# Patient Record
Sex: MALE | Race: WHITE | NOT HISPANIC OR LATINO | Employment: FULL TIME | URBAN - METROPOLITAN AREA
[De-identification: names, ages, dates, MRNs, and addresses within clinical notes are randomized per-mention and may not be internally consistent; named-entity substitution may affect disease eponyms.]

---

## 2021-04-08 ENCOUNTER — OFFICE VISIT (OUTPATIENT)
Dept: GASTROENTEROLOGY | Facility: CLINIC | Age: 41
End: 2021-04-08

## 2021-04-08 VITALS — WEIGHT: 226.4 LBS | TEMPERATURE: 97 F | BODY MASS INDEX: 28.15 KG/M2 | HEIGHT: 75 IN

## 2021-04-08 DIAGNOSIS — R13.12 OROPHARYNGEAL DYSPHAGIA: Primary | ICD-10-CM

## 2021-04-08 PROCEDURE — 99203 OFFICE O/P NEW LOW 30 MIN: CPT | Performed by: INTERNAL MEDICINE

## 2021-04-08 RX ORDER — OMEPRAZOLE 20 MG/1
20 CAPSULE, DELAYED RELEASE ORAL DAILY
Qty: 90 CAPSULE | Refills: 3 | Status: SHIPPED | OUTPATIENT
Start: 2021-04-08

## 2021-04-08 RX ORDER — MULTIPLE VITAMINS W/ MINERALS TAB 9MG-400MCG
1 TAB ORAL DAILY
COMMUNITY

## 2021-04-08 RX ORDER — CALCIUM CARBONATE 200(500)MG
1 TABLET,CHEWABLE ORAL DAILY
COMMUNITY

## 2021-04-08 NOTE — PROGRESS NOTES
Chief Complaint   Patient presents with   • Difficulty Swallowing     Caio Barton is a 40 y.o. male who presents with a history of dysphagia  HPI     Patient 40-year-old male with no significant past medical history complaining of 6 months of dysphagia to solids.  Patient reports a distant history of upper GI issues underwent EGD more than 10 years ago was found with gastritis.  Follow-up endoscopy at 2011 was unremarkable the patient has been relatively asymptomatic since then.  Patient had been taking omeprazole frequently throughout this time usually as needed.  Patient noted in the last 6 months development of this dysphagia though he does report similar symptoms in his father who was recently diagnosed with laryngeal cancer with tj disease in his neck.  Patient concerned it might be more sympathy discomfort.    History reviewed. No pertinent past medical history.    Current Outpatient Medications:   •  BLACK ELDERBERRY PO, Take  by mouth Daily., Disp: , Rfl:   •  calcium carbonate (TUMS) 500 MG chewable tablet, Chew 1 tablet Daily., Disp: , Rfl:   •  multivitamin with minerals (MULTIVITAMIN MEN PO), Take 1 tablet by mouth Daily., Disp: , Rfl:   •  omeprazole (priLOSEC) 20 MG capsule, Take 1 capsule by mouth Daily., Disp: 90 capsule, Rfl: 3  No Known Allergies  Social History     Socioeconomic History   • Marital status:      Spouse name: Not on file   • Number of children: Not on file   • Years of education: Not on file   • Highest education level: Not on file   Tobacco Use   • Smoking status: Never Smoker   • Smokeless tobacco: Never Used   Substance and Sexual Activity   • Alcohol use: Never   • Drug use: Never     History reviewed. No pertinent family history.  Review of Systems   Constitutional: Negative.    HENT: Positive for trouble swallowing. Negative for congestion, dental problem, sneezing, sore throat, tinnitus and voice change.    Eyes: Negative.    Respiratory: Negative.    Cardiovascular:  Negative.    Gastrointestinal: Negative.    Endocrine: Negative.    Musculoskeletal: Negative.    Skin: Negative.    Allergic/Immunologic: Negative.    Hematological: Negative.      Vitals:    04/08/21 1149   Temp: 97 °F (36.1 °C)     Physical Exam  Vitals and nursing note reviewed.   Constitutional:       Appearance: Normal appearance. He is well-developed and normal weight.   HENT:      Head: Normocephalic and atraumatic.   Eyes:      General: No scleral icterus.     Conjunctiva/sclera: Conjunctivae normal.   Neck:      Trachea: No tracheal deviation.   Cardiovascular:      Rate and Rhythm: Normal rate and regular rhythm.      Heart sounds: No murmur heard.   No friction rub. No gallop.    Pulmonary:      Effort: Pulmonary effort is normal. No respiratory distress.      Breath sounds: Normal breath sounds. No wheezing or rales.   Abdominal:      General: Bowel sounds are normal. There is no distension.      Palpations: Abdomen is soft. There is no mass.      Tenderness: There is no abdominal tenderness. There is no guarding or rebound.   Musculoskeletal:         General: No swelling. Normal range of motion.      Cervical back: Normal range of motion and neck supple. No rigidity.   Skin:     General: Skin is warm and dry.      Coloration: Skin is not jaundiced.      Findings: No rash.   Neurological:      General: No focal deficit present.      Mental Status: He is alert and oriented to person, place, and time.   Psychiatric:         Behavior: Behavior normal.         Thought Content: Thought content normal.         Judgment: Judgment normal.       Diagnoses and all orders for this visit:    Oropharyngeal dysphagia  -     FL Esophagram Complete; Future    Other orders  -     BLACK ELDERBERRY PO; Take  by mouth Daily.  -     multivitamin with minerals (MULTIVITAMIN MEN PO); Take 1 tablet by mouth Daily.  -     calcium carbonate (TUMS) 500 MG chewable tablet; Chew 1 tablet Daily.  -     omeprazole (priLOSEC) 20 MG  capsule; Take 1 capsule by mouth Daily.    Patient 40-year-old male with no significant past medical history reports 6 months of intermittent oropharyngeal dysphagia.  Patient reports years ago underwent endoscopy for upper GI symptoms and found with some gastritis.  Follow-up endoscopy in 2011 was otherwise negative but is not particularly sure why they repeated his endoscopy.  Patient now with this dysphagia noted had been on omeprazole on and off for a number of years now recently just taking Tums as needed.  Patient reports that the swallowing issues are almost always to solids but no specific foods.  Patient denies any weight loss no fever chills.  Will arrange esophagram to evaluate the patient describes father with laryngeal cancer with lymph nodes into his neck causing similar symptoms.  Patient concerned it might be related to the stress of his father going through it that is having similar symptoms.

## 2021-04-14 ENCOUNTER — TRANSCRIBE ORDERS (OUTPATIENT)
Dept: ADMINISTRATIVE | Facility: HOSPITAL | Age: 41
End: 2021-04-14

## 2021-04-14 DIAGNOSIS — Z01.818 OTHER SPECIFIED PRE-OPERATIVE EXAMINATION: Primary | ICD-10-CM

## 2021-04-28 ENCOUNTER — TRANSCRIBE ORDERS (OUTPATIENT)
Dept: SLEEP MEDICINE | Facility: HOSPITAL | Age: 41
End: 2021-04-28

## 2021-04-28 DIAGNOSIS — Z01.818 OTHER SPECIFIED PRE-OPERATIVE EXAMINATION: Primary | ICD-10-CM

## 2021-04-30 ENCOUNTER — LAB (OUTPATIENT)
Dept: LAB | Facility: HOSPITAL | Age: 41
End: 2021-04-30

## 2021-04-30 DIAGNOSIS — Z01.818 OTHER SPECIFIED PRE-OPERATIVE EXAMINATION: ICD-10-CM

## 2021-04-30 PROCEDURE — C9803 HOPD COVID-19 SPEC COLLECT: HCPCS

## 2021-04-30 PROCEDURE — U0004 COV-19 TEST NON-CDC HGH THRU: HCPCS

## 2021-05-01 ENCOUNTER — APPOINTMENT (OUTPATIENT)
Dept: LAB | Facility: HOSPITAL | Age: 41
End: 2021-05-01

## 2021-05-01 LAB — SARS-COV-2 ORF1AB RESP QL NAA+PROBE: NOT DETECTED

## 2021-05-03 ENCOUNTER — HOSPITAL ENCOUNTER (OUTPATIENT)
Dept: GENERAL RADIOLOGY | Facility: HOSPITAL | Age: 41
Discharge: HOME | End: 2021-05-03
Admitting: INTERNAL MEDICINE

## 2021-05-03 DIAGNOSIS — R13.12 OROPHARYNGEAL DYSPHAGIA: ICD-10-CM

## 2021-05-03 PROCEDURE — 74221 X-RAY XM ESOPHAGUS 2CNTRST: CPT

## 2021-05-03 PROCEDURE — 74220 X-RAY XM ESOPHAGUS 1CNTRST: CPT

## 2021-05-03 RX ADMIN — BARIUM SULFATE 183 ML: 960 POWDER, FOR SUSPENSION ORAL at 09:03

## 2021-05-03 RX ADMIN — BARIUM SULFATE 700 MG: 700 TABLET ORAL at 09:03

## 2021-05-03 RX ADMIN — BARIUM SULFATE 135 ML: 980 POWDER, FOR SUSPENSION ORAL at 09:03

## 2021-05-03 RX ADMIN — ANTACID/ANTIFLATULENT 1 TABLET: 380; 550; 10; 10 GRANULE, EFFERVESCENT ORAL at 09:03

## 2021-05-11 ENCOUNTER — TELEPHONE (OUTPATIENT)
Dept: GASTROENTEROLOGY | Facility: CLINIC | Age: 41
End: 2021-05-11

## 2021-05-11 NOTE — TELEPHONE ENCOUNTER
----- Message from Tom Colney sent at 5/11/2021 12:39 PM EDT -----  Regarding: Results  Contact: [phone]  Pt is calling for results

## 2021-05-26 ENCOUNTER — TELEPHONE (OUTPATIENT)
Dept: GASTROENTEROLOGY | Facility: CLINIC | Age: 41
End: 2021-05-26

## 2021-05-26 NOTE — TELEPHONE ENCOUNTER
----- Message from Morro Lopez MD sent at 5/25/2021  4:58 PM EDT -----  Esophagram showed some reflux noted actually a single episode during the study but no obstruction or stricture seen no evidence of growth or pressure on the esophagus.  Continue omeprazole and if not improved will change PPI.

## 2021-05-26 NOTE — TELEPHONE ENCOUNTER
----- Message from Morro Lopez MD sent at 5/11/2021  2:43 PM EDT -----  Esophagram with a small sliding hiatal hernia but no narrowing or abnormality seen.  Resume daily omeprazole for 3 months and follow-up at that time to discuss need for further therapy